# Patient Record
Sex: MALE | Race: WHITE | URBAN - METROPOLITAN AREA
[De-identification: names, ages, dates, MRNs, and addresses within clinical notes are randomized per-mention and may not be internally consistent; named-entity substitution may affect disease eponyms.]

---

## 2021-05-03 ENCOUNTER — OFFICE VISIT (OUTPATIENT)
Dept: URBAN - METROPOLITAN AREA CLINIC 82 | Facility: CLINIC | Age: 71
End: 2021-05-03
Payer: MEDICARE

## 2021-05-03 DIAGNOSIS — Z79.84 LONG TERM (CURRENT) USE OF ORAL ANTIDIABETIC DRUGS: ICD-10-CM

## 2021-05-03 DIAGNOSIS — H35.40 PERIPHERAL RETINAL DEGENERATION: ICD-10-CM

## 2021-05-03 DIAGNOSIS — E11.9 TYPE 2 DIABETES MELLITUS W/O COMPLICATION: ICD-10-CM

## 2021-05-03 DIAGNOSIS — H40.1131 PRIMARY OPEN-ANGLE GLAUCOMA, BILATERAL, MILD STAGE: Primary | ICD-10-CM

## 2021-05-03 PROCEDURE — 99214 OFFICE O/P EST MOD 30 MIN: CPT | Performed by: OPTOMETRIST

## 2021-05-03 PROCEDURE — 99204 OFFICE O/P NEW MOD 45 MIN: CPT | Performed by: OPTOMETRIST

## 2021-05-03 RX ORDER — LATANOPROST 50 UG/ML
0.005 % SOLUTION OPHTHALMIC
Qty: 2.5 | Refills: 6 | Status: INACTIVE
Start: 2021-05-03 | End: 2022-02-09

## 2021-05-03 ASSESSMENT — INTRAOCULAR PRESSURE
OD: 18
OS: 17

## 2021-05-03 NOTE — IMPRESSION/PLAN
Impression: Long term (current) use of oral antidiabetic drugs: Z79.84. Plan: Will continue to observe.

## 2021-05-03 NOTE — IMPRESSION/PLAN
Impression: Primary open-angle glaucoma, bilateral, mild stage: H40.1131. Plan: Intraocular pressure well controlled, tolerating medications. Will continue with Xalatan at night in both eyes.

## 2021-08-02 ENCOUNTER — OFFICE VISIT (OUTPATIENT)
Dept: URBAN - METROPOLITAN AREA CLINIC 82 | Facility: CLINIC | Age: 71
End: 2021-08-02
Payer: MEDICARE

## 2021-08-02 PROCEDURE — 99212 OFFICE O/P EST SF 10 MIN: CPT | Performed by: OPTOMETRIST

## 2021-08-02 ASSESSMENT — INTRAOCULAR PRESSURE
OS: 20
OD: 19

## 2021-08-02 NOTE — IMPRESSION/PLAN
Impression: Primary open-angle glaucoma, bilateral, mild stage: H40.1131. Plan: Intraocular pressure well controlled, tolerating medications. Will continue with Latanoprost HS OU.

## 2021-11-03 ENCOUNTER — OFFICE VISIT (OUTPATIENT)
Dept: URBAN - METROPOLITAN AREA CLINIC 82 | Facility: CLINIC | Age: 71
End: 2021-11-03
Payer: MEDICARE

## 2021-11-03 PROCEDURE — 99212 OFFICE O/P EST SF 10 MIN: CPT | Performed by: OPTOMETRIST

## 2021-11-03 ASSESSMENT — INTRAOCULAR PRESSURE
OS: 20
OD: 21

## 2022-02-09 ENCOUNTER — OFFICE VISIT (OUTPATIENT)
Dept: URBAN - METROPOLITAN AREA CLINIC 82 | Facility: CLINIC | Age: 72
End: 2022-02-09
Payer: MEDICARE

## 2022-02-09 PROCEDURE — 99213 OFFICE O/P EST LOW 20 MIN: CPT | Performed by: OPTOMETRIST

## 2022-02-09 PROCEDURE — 92133 CPTRZD OPH DX IMG PST SGM ON: CPT | Performed by: OPTOMETRIST

## 2022-02-09 RX ORDER — LATANOPROST 50 UG/ML
0.005 % SOLUTION OPHTHALMIC
Qty: 2.5 | Refills: 6 | Status: ACTIVE
Start: 2022-02-09

## 2022-02-09 ASSESSMENT — INTRAOCULAR PRESSURE
OS: 21
OD: 20

## 2022-05-11 ENCOUNTER — OFFICE VISIT (OUTPATIENT)
Dept: URBAN - METROPOLITAN AREA CLINIC 82 | Facility: CLINIC | Age: 72
End: 2022-05-11
Payer: MEDICARE

## 2022-05-11 DIAGNOSIS — H40.1131 PRIMARY OPEN-ANGLE GLAUCOMA, BILATERAL, MILD STAGE: Primary | ICD-10-CM

## 2022-05-11 DIAGNOSIS — H25.13 AGE-RELATED NUCLEAR CATARACT, BILATERAL: ICD-10-CM

## 2022-05-11 PROCEDURE — 92250 FUNDUS PHOTOGRAPHY W/I&R: CPT | Performed by: OPTOMETRIST

## 2022-05-11 PROCEDURE — 99213 OFFICE O/P EST LOW 20 MIN: CPT | Performed by: OPTOMETRIST

## 2022-05-11 PROCEDURE — 92015 DETERMINE REFRACTIVE STATE: CPT | Performed by: OPTOMETRIST

## 2022-05-11 ASSESSMENT — INTRAOCULAR PRESSURE
OS: 20
OD: 21

## 2022-05-11 NOTE — IMPRESSION/PLAN
Impression: Primary open-angle glaucoma, bilateral, mild stage: H40.1131. Plan: Intraocular pressure well controlled, tolerating medications.   Will continue with Latanoprost HS OU

## 2022-08-10 ENCOUNTER — OFFICE VISIT (OUTPATIENT)
Dept: URBAN - METROPOLITAN AREA CLINIC 82 | Facility: CLINIC | Age: 72
End: 2022-08-10
Payer: MEDICARE

## 2022-08-10 DIAGNOSIS — H25.13 AGE-RELATED NUCLEAR CATARACT, BILATERAL: ICD-10-CM

## 2022-08-10 DIAGNOSIS — H40.1131 PRIMARY OPEN-ANGLE GLAUCOMA, BILATERAL, MILD STAGE: Primary | ICD-10-CM

## 2022-08-10 PROCEDURE — 99212 OFFICE O/P EST SF 10 MIN: CPT | Performed by: OPTOMETRIST

## 2022-08-10 ASSESSMENT — INTRAOCULAR PRESSURE
OS: 20
OD: 20

## 2022-09-16 ENCOUNTER — OFFICE VISIT (OUTPATIENT)
Dept: URBAN - METROPOLITAN AREA CLINIC 82 | Facility: CLINIC | Age: 72
End: 2022-09-16
Payer: MEDICARE

## 2022-09-16 DIAGNOSIS — H01.006 BLEPHARITIS OF LEFT EYELID: Primary | ICD-10-CM

## 2022-09-16 DIAGNOSIS — H40.1131 PRIMARY OPEN-ANGLE GLAUCOMA, BILATERAL, MILD STAGE: ICD-10-CM

## 2022-09-16 DIAGNOSIS — H01.003 BLEPHARITIS OF RIGHT EYELID: ICD-10-CM

## 2022-09-16 DIAGNOSIS — L40.9 PSORIASIS: ICD-10-CM

## 2022-09-16 PROCEDURE — 99212 OFFICE O/P EST SF 10 MIN: CPT | Performed by: OPTOMETRIST

## 2022-09-16 ASSESSMENT — INTRAOCULAR PRESSURE
OS: 19
OD: 21

## 2022-09-16 NOTE — IMPRESSION/PLAN
Impression: Blepharitis of right eyelid: H01.003. Plan: Discussed exam findings in detail with patient. Advised patient to scrub lids with Cliradex HS OU. Advised patient to apply Cortaid cream to eyelids only for itching.

## 2022-09-16 NOTE — IMPRESSION/PLAN
Impression: Blepharitis of left eyelid: H01.006. Plan: Discussed exam findings in detail with patient. Advised patient to scrub lids with Cliradex HS OU. Advised patient to apply Cortaid cream to eyelids only for itching.

## 2022-11-09 ENCOUNTER — OFFICE VISIT (OUTPATIENT)
Dept: URBAN - METROPOLITAN AREA CLINIC 82 | Facility: CLINIC | Age: 72
End: 2022-11-09
Payer: MEDICARE

## 2022-11-09 DIAGNOSIS — H40.1131 PRIMARY OPEN-ANGLE GLAUCOMA, BILATERAL, MILD STAGE: Primary | ICD-10-CM

## 2022-11-09 DIAGNOSIS — H25.13 AGE-RELATED NUCLEAR CATARACT, BILATERAL: ICD-10-CM

## 2022-11-09 PROCEDURE — 99213 OFFICE O/P EST LOW 20 MIN: CPT | Performed by: OPTOMETRIST

## 2022-11-09 PROCEDURE — 92083 EXTENDED VISUAL FIELD XM: CPT | Performed by: OPTOMETRIST

## 2022-11-09 ASSESSMENT — KERATOMETRY
OS: 43.88
OD: 43.50

## 2022-11-09 ASSESSMENT — INTRAOCULAR PRESSURE
OD: 16
OS: 20

## 2023-02-08 ENCOUNTER — OFFICE VISIT (OUTPATIENT)
Dept: URBAN - METROPOLITAN AREA CLINIC 82 | Facility: CLINIC | Age: 73
End: 2023-02-08
Payer: MEDICARE

## 2023-02-08 DIAGNOSIS — E11.9 TYPE 2 DIABETES MELLITUS W/O COMPLICATION: ICD-10-CM

## 2023-02-08 DIAGNOSIS — H25.13 AGE-RELATED NUCLEAR CATARACT, BILATERAL: ICD-10-CM

## 2023-02-08 DIAGNOSIS — H40.1132 PRIMARY OPEN-ANGLE GLAUCOMA, BILATERAL, MODERATE STAGE: Primary | ICD-10-CM

## 2023-02-08 PROCEDURE — 92133 CPTRZD OPH DX IMG PST SGM ON: CPT | Performed by: OPTOMETRIST

## 2023-02-08 PROCEDURE — 99213 OFFICE O/P EST LOW 20 MIN: CPT | Performed by: OPTOMETRIST

## 2023-02-08 ASSESSMENT — INTRAOCULAR PRESSURE
OD: 15
OS: 15

## 2023-02-08 NOTE — IMPRESSION/PLAN
Impression: Primary open-angle glaucoma, bilateral, moderate stage: A70.0633. Plan: Intraocular pressure well controlled, tolerating medications. Will continue with Latanoprost HS OU.

## 2023-05-10 ENCOUNTER — OFFICE VISIT (OUTPATIENT)
Dept: URBAN - METROPOLITAN AREA CLINIC 82 | Facility: CLINIC | Age: 73
End: 2023-05-10
Payer: MEDICARE

## 2023-05-10 DIAGNOSIS — Z79.84 LONG TERM (CURRENT) USE OF ORAL ANTIDIABETIC DRUGS: ICD-10-CM

## 2023-05-10 DIAGNOSIS — H25.13 AGE-RELATED NUCLEAR CATARACT, BILATERAL: ICD-10-CM

## 2023-05-10 DIAGNOSIS — E11.9 TYPE 2 DIABETES MELLITUS W/O COMPLICATION: ICD-10-CM

## 2023-05-10 DIAGNOSIS — H40.1132 PRIMARY OPEN-ANGLE GLAUCOMA, BILATERAL, MODERATE STAGE: Primary | ICD-10-CM

## 2023-05-10 PROCEDURE — 99213 OFFICE O/P EST LOW 20 MIN: CPT | Performed by: OPTOMETRIST

## 2023-05-10 PROCEDURE — 92250 FUNDUS PHOTOGRAPHY W/I&R: CPT | Performed by: OPTOMETRIST

## 2023-05-10 ASSESSMENT — INTRAOCULAR PRESSURE
OD: 15
OS: 16

## 2023-05-10 NOTE — IMPRESSION/PLAN
Impression: Primary open-angle glaucoma, bilateral, moderate stage: H72.3146. Plan: Intraocular pressure well controlled, tolerating medications. Will continue with Latanoprost HS OU.

## 2023-05-10 NOTE — IMPRESSION/PLAN
Impression: Type 2 diabetes mellitus w/o complication: S76.8. Plan: Diabetes type II: no background retinopathy. Discussed ocular and systemic benefits of blood sugar and blood pressure control. Call the office if any changes in vision status or ocular symptoms occur.

## 2023-08-09 ENCOUNTER — OFFICE VISIT (OUTPATIENT)
Dept: URBAN - METROPOLITAN AREA CLINIC 82 | Facility: CLINIC | Age: 73
End: 2023-08-09
Payer: MEDICARE

## 2023-08-09 DIAGNOSIS — H40.1132 PRIMARY OPEN-ANGLE GLAUCOMA, BILATERAL, MODERATE STAGE: Primary | ICD-10-CM

## 2023-08-09 DIAGNOSIS — H25.13 AGE-RELATED NUCLEAR CATARACT, BILATERAL: ICD-10-CM

## 2023-08-09 PROCEDURE — 99212 OFFICE O/P EST SF 10 MIN: CPT | Performed by: OPTOMETRIST

## 2023-08-09 RX ORDER — LATANOPROST 50 UG/ML
0.005 % SOLUTION OPHTHALMIC
Qty: 7.5 | Refills: 5 | Status: ACTIVE
Start: 2023-08-09

## 2023-08-09 ASSESSMENT — INTRAOCULAR PRESSURE
OD: 21
OS: 18

## 2023-11-15 ENCOUNTER — Encounter (OUTPATIENT)
Dept: URBAN - METROPOLITAN AREA CLINIC 82 | Facility: CLINIC | Age: 73
End: 2023-11-15
Payer: MEDICARE

## 2023-11-15 DIAGNOSIS — H25.13 AGE-RELATED NUCLEAR CATARACT, BILATERAL: ICD-10-CM

## 2023-11-15 PROCEDURE — 99212 OFFICE O/P EST SF 10 MIN: CPT | Performed by: OPTOMETRIST

## 2024-02-14 ENCOUNTER — OFFICE VISIT (OUTPATIENT)
Dept: URBAN - METROPOLITAN AREA CLINIC 82 | Facility: CLINIC | Age: 74
End: 2024-02-14
Payer: MEDICARE

## 2024-02-14 DIAGNOSIS — H40.1132 PRIMARY OPEN-ANGLE GLAUCOMA, BILATERAL, MODERATE STAGE: ICD-10-CM

## 2024-02-14 PROCEDURE — 99212 OFFICE O/P EST SF 10 MIN: CPT | Performed by: OPTOMETRIST

## 2024-02-14 ASSESSMENT — INTRAOCULAR PRESSURE
OD: 16
OS: 13

## 2024-05-13 ENCOUNTER — OFFICE VISIT (OUTPATIENT)
Dept: URBAN - METROPOLITAN AREA CLINIC 82 | Facility: CLINIC | Age: 74
End: 2024-05-13
Payer: MEDICARE

## 2024-05-13 DIAGNOSIS — H40.1132 PRIMARY OPEN-ANGLE GLAUCOMA, BILATERAL, MODERATE STAGE: Primary | ICD-10-CM

## 2024-05-13 DIAGNOSIS — H53.451 PERIPHERAL VISUAL FIELD DEFECT OF RIGHT EYE: ICD-10-CM

## 2024-05-13 DIAGNOSIS — H25.13 AGE-RELATED NUCLEAR CATARACT, BILATERAL: ICD-10-CM

## 2024-05-13 PROCEDURE — 99213 OFFICE O/P EST LOW 20 MIN: CPT | Performed by: OPTOMETRIST

## 2024-05-13 PROCEDURE — 92082 INTERMEDIATE VISUAL FIELD XM: CPT | Performed by: OPTOMETRIST

## 2024-05-13 PROCEDURE — 92133 CPTRZD OPH DX IMG PST SGM ON: CPT | Performed by: OPTOMETRIST

## 2024-05-13 ASSESSMENT — INTRAOCULAR PRESSURE
OS: 18
OD: 16

## 2024-08-14 ENCOUNTER — OFFICE VISIT (OUTPATIENT)
Dept: URBAN - METROPOLITAN AREA CLINIC 82 | Facility: CLINIC | Age: 74
End: 2024-08-14
Payer: MEDICARE

## 2024-08-14 DIAGNOSIS — H40.1132 PRIMARY OPEN-ANGLE GLAUCOMA, BILATERAL, MODERATE STAGE: Primary | ICD-10-CM

## 2024-08-14 DIAGNOSIS — H53.451 PERIPHERAL VISUAL FIELD DEFECT OF RIGHT EYE: ICD-10-CM

## 2024-08-14 DIAGNOSIS — H25.13 AGE-RELATED NUCLEAR CATARACT, BILATERAL: ICD-10-CM

## 2024-08-14 PROCEDURE — 99213 OFFICE O/P EST LOW 20 MIN: CPT | Performed by: OPTOMETRIST

## 2024-08-14 PROCEDURE — 92250 FUNDUS PHOTOGRAPHY W/I&R: CPT | Performed by: OPTOMETRIST

## 2024-08-14 ASSESSMENT — INTRAOCULAR PRESSURE
OS: 15
OD: 16

## 2024-11-04 ENCOUNTER — OFFICE VISIT (OUTPATIENT)
Dept: URBAN - METROPOLITAN AREA CLINIC 82 | Facility: CLINIC | Age: 74
End: 2024-11-04
Payer: MEDICARE

## 2024-11-04 DIAGNOSIS — H40.1132 PRIMARY OPEN-ANGLE GLAUCOMA, BILATERAL, MODERATE STAGE: Primary | ICD-10-CM

## 2024-11-04 DIAGNOSIS — H25.13 AGE-RELATED NUCLEAR CATARACT, BILATERAL: ICD-10-CM

## 2024-11-04 PROCEDURE — 99212 OFFICE O/P EST SF 10 MIN: CPT | Performed by: OPTOMETRIST

## 2024-11-04 ASSESSMENT — INTRAOCULAR PRESSURE
OD: 13
OS: 12

## 2025-02-03 ENCOUNTER — OFFICE VISIT (OUTPATIENT)
Dept: URBAN - METROPOLITAN AREA CLINIC 82 | Facility: CLINIC | Age: 75
End: 2025-02-03
Payer: MEDICARE

## 2025-02-03 DIAGNOSIS — H25.13 AGE-RELATED NUCLEAR CATARACT, BILATERAL: ICD-10-CM

## 2025-02-03 DIAGNOSIS — H40.1132 PRIMARY OPEN-ANGLE GLAUCOMA, BILATERAL, MODERATE STAGE: Primary | ICD-10-CM

## 2025-02-03 PROCEDURE — 99212 OFFICE O/P EST SF 10 MIN: CPT | Performed by: OPTOMETRIST

## 2025-02-03 ASSESSMENT — INTRAOCULAR PRESSURE
OD: 12
OS: 21
OS: 16

## 2025-05-12 ENCOUNTER — OFFICE VISIT (OUTPATIENT)
Dept: URBAN - METROPOLITAN AREA CLINIC 82 | Facility: CLINIC | Age: 75
End: 2025-05-12
Payer: MEDICARE

## 2025-05-12 DIAGNOSIS — H40.1132 PRIMARY OPEN-ANGLE GLAUCOMA, BILATERAL, MODERATE STAGE: Primary | ICD-10-CM

## 2025-05-12 DIAGNOSIS — H25.13 AGE-RELATED NUCLEAR CATARACT, BILATERAL: ICD-10-CM

## 2025-05-12 PROCEDURE — 99213 OFFICE O/P EST LOW 20 MIN: CPT | Performed by: OPTOMETRIST

## 2025-05-12 PROCEDURE — 92083 EXTENDED VISUAL FIELD XM: CPT | Performed by: OPTOMETRIST

## 2025-05-12 RX ORDER — LATANOPROST 50 UG/ML
0.005 % SOLUTION OPHTHALMIC
Qty: 7.5 | Refills: 3 | Status: ACTIVE
Start: 2025-05-12

## 2025-05-12 ASSESSMENT — INTRAOCULAR PRESSURE
OD: 19
OS: 18

## 2025-08-18 ENCOUNTER — OFFICE VISIT (OUTPATIENT)
Dept: URBAN - METROPOLITAN AREA CLINIC 82 | Facility: CLINIC | Age: 75
End: 2025-08-18
Payer: MEDICARE

## 2025-08-18 DIAGNOSIS — H40.1132 PRIMARY OPEN-ANGLE GLAUCOMA, BILATERAL, MODERATE STAGE: Primary | ICD-10-CM

## 2025-08-18 DIAGNOSIS — H25.13 AGE-RELATED NUCLEAR CATARACT, BILATERAL: ICD-10-CM

## 2025-08-18 PROCEDURE — 92250 FUNDUS PHOTOGRAPHY W/I&R: CPT | Performed by: OPTOMETRIST

## 2025-08-18 PROCEDURE — 99213 OFFICE O/P EST LOW 20 MIN: CPT | Performed by: OPTOMETRIST

## 2025-08-18 ASSESSMENT — INTRAOCULAR PRESSURE
OD: 17
OS: 15